# Patient Record
Sex: MALE | Race: OTHER
[De-identification: names, ages, dates, MRNs, and addresses within clinical notes are randomized per-mention and may not be internally consistent; named-entity substitution may affect disease eponyms.]

---

## 2018-11-16 ENCOUNTER — HOSPITAL ENCOUNTER (EMERGENCY)
Dept: HOSPITAL 80 - FED | Age: 28
Discharge: HOME | End: 2018-11-16
Payer: COMMERCIAL

## 2018-11-16 VITALS — SYSTOLIC BLOOD PRESSURE: 124 MMHG | DIASTOLIC BLOOD PRESSURE: 73 MMHG

## 2018-11-16 DIAGNOSIS — B27.90: Primary | ICD-10-CM

## 2018-11-16 NOTE — EDPHY
H & P


Time Seen by Provider: 11/16/18 12:24


HPI/ROS: 





Chief complaint.  Possible PE





HPI.  Patient is a 28-year-old male was diagnosed at [facility] 

today with mono.  For the past 3 days he has had some swollen lymph nodes in 

his neck and some sore throat.  Today he had left-sided chest discomfort with 

deep breathing.  He has left upper quadrant pain.  He feels slight shortness of 

breath.  No unusual leg pain or swelling.  A D-dimer was drawn at the [facility] and was elevated.  He was sent to rule out pulmonary embolus





ROS


10 systems were reviewed and negative with the exception of the elements 

mentioned in the history of present illness





Past Medical/Surgical History: 





Mono


Social History: 





Single, nonsmoker, no alcohol


Physical Exam: 





General Appearance:  Alert well-developed male mild distress vital signs are 

stable.  Pulse oximeter is 96% sat on room


Eyes: Pupils equal and round no pallor or injection.


ENT, pharynx mildly injected without exudate.  Anterior cervical adenopathy


Respiratory:  There are no retractions, lungs are clear to auscultation.


Cardiovascular: Regular rate and rhythm.


Gastrointestinal:  Abdomen is soft with mild tenderness in the left upper 

quadrant.


Neurological:  Awake and alert, sensory and motor exams grossly normal.


Skin: Warm and dry, no rashes.


Musculoskeletal:  Neck is supple nontender.


Extremities  symmetrical, full range of motion.


Psychiatric: Patient is oriented X 3, there is no agitation.


Constitutional: 


 Initial Vital Signs











Temperature (C)  36.8 C   11/16/18 11:31


 


Heart Rate  74   11/16/18 11:31


 


Respiratory Rate  18   11/16/18 11:31


 


Blood Pressure  110/88 H  11/16/18 11:31


 


O2 Sat (%)  96   11/16/18 11:31








 











O2 Delivery Mode               Room Air














Allergies/Adverse Reactions: 


 





No Known Allergies Allergy (Unverified 11/16/18 11:31)


 








Home Medications: 














 Medication  Instructions  Recorded


 


NK [No Known Home Meds]  11/16/18














Medical Decision Making





- Diagnostics


Imaging Results: 


 Imaging Impressions





Chest/Thorax CTA  11/16/18 12:30


Impression:


1. No pulmonary embolism.


2. Clear lungs.  Findings and recommendations discussed with SEFERINO GONZÁLES  at 

1:47 PM hour, 11/16/2018.


 


Final report concurs with initial preliminary interpretation.


 








CT angiogram reviewed by me and discussed with Dr. Jorgensen shows no evidence for PE


Procedures: 





IV normal saline


ED Course/Re-evaluation: 





On re-evaluation at 1:55 p.m. Patient is stable.  He and I discussed imaging 

study results.  We discussed treatment plan for mononucleosis with warnings 

given to avoid activity that may result in Trauma for the next 3 weeks.  He 

expresses understanding and agreement


Differential Diagnosis: 





Patient has mono.  There was concern for pulmonary embolus.  He does not have 

pulmonary embolus or pneumonia





- Data Points


Laboratory Results: 


 Laboratory Results





 11/16/18 11:50 





 











  11/16/18





  11:50


 


Sodium  138 mEq/L mEq/L





   (135-145) 


 


Potassium  4.3 mEq/L mEq/L





   (3.3-5.0) 


 


Chloride  103 mEq/L mEq/L





   () 


 


Carbon Dioxide  26 mEq/l mEq/l





   (22-31) 


 


Anion Gap  9 mEq/L mEq/L





   (6-14) 


 


BUN  10 mg/dL mg/dL





   (7-23) 


 


Creatinine  1.0 mg/dL mg/dL





   (0.7-1.3) 


 


Estimated GFR  > 60 





  


 


Glucose  92 mg/dL mg/dL





   () 


 


Calcium  9.2 mg/dL mg/dL





   (8.5-10.4) 











Medications Given: 


 








Discontinued Medications





Sodium Chloride (Ns)  1,000 mls @ 0 mls/hr IV ONCE ONE; Wide Open


   PRN Reason: Protocol


   Stop: 11/16/18 12:30


   Last Admin: 11/16/18 12:33 Dose:  1,000 mls








Departure





- Departure


Disposition: Home, Routine, Self-Care


Clinical Impression: 


 Mononucleosis





Condition: Good


Instructions:  Mononucleosis (ED)


Additional Instructions: 


Drink plenty of fluids and stay hydrated.





Ibuprofen 600 mg every 6 hr, Tylenol 1000 mg every 4-6 hours as needed for 

discomfort





Avoid any activity that may result in trauma for the next 3 weeks.





Return for worsening symptoms including worsening breathing or difficulty 

swallowing.





Follow-up at [facility]


Referrals: 


NONE *PRIMARY CARE P,. [Primary Care Provider] - As per Instructions


[facility] [Clinic] - As per Instructions